# Patient Record
Sex: FEMALE | Race: WHITE | URBAN - METROPOLITAN AREA
[De-identification: names, ages, dates, MRNs, and addresses within clinical notes are randomized per-mention and may not be internally consistent; named-entity substitution may affect disease eponyms.]

---
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
## 2018-10-06 ENCOUNTER — ED HISTORICAL/CONVERTED ENCOUNTER (OUTPATIENT)
Dept: OTHER | Age: 18
End: 2018-10-06

## 2018-10-30 ENCOUNTER — ED HISTORICAL/CONVERTED ENCOUNTER (OUTPATIENT)
Dept: OTHER | Age: 18
End: 2018-10-30

## 2018-12-01 ENCOUNTER — ED HISTORICAL/CONVERTED ENCOUNTER (OUTPATIENT)
Dept: OTHER | Age: 18
End: 2018-12-01

## 2019-04-23 ENCOUNTER — ED HISTORICAL/CONVERTED ENCOUNTER (OUTPATIENT)
Dept: OTHER | Age: 19
End: 2019-04-23

## 2019-05-22 ENCOUNTER — ED HISTORICAL/CONVERTED ENCOUNTER (OUTPATIENT)
Dept: OTHER | Age: 19
End: 2019-05-22

## 2024-02-13 ENCOUNTER — TRANSCRIBE ORDERS (OUTPATIENT)
Facility: HOSPITAL | Age: 24
End: 2024-02-13

## 2024-02-13 DIAGNOSIS — M25.262 FLAIL JOINT OF LEFT KNEE: Primary | ICD-10-CM

## 2024-02-23 ENCOUNTER — HOSPITAL ENCOUNTER (OUTPATIENT)
Facility: HOSPITAL | Age: 24
End: 2024-02-23
Payer: MEDICAID

## 2024-02-23 DIAGNOSIS — M25.262 FLAIL JOINT OF LEFT KNEE: ICD-10-CM

## 2024-02-23 PROCEDURE — 73721 MRI JNT OF LWR EXTRE W/O DYE: CPT

## 2024-08-21 ENCOUNTER — HOSPITAL ENCOUNTER (EMERGENCY)
Facility: HOSPITAL | Age: 24
Discharge: HOME | End: 2024-08-21
Payer: MEDICAID

## 2024-08-21 VITALS
SYSTOLIC BLOOD PRESSURE: 120 MMHG | BODY MASS INDEX: 23.04 KG/M2 | WEIGHT: 130 LBS | OXYGEN SATURATION: 100 % | DIASTOLIC BLOOD PRESSURE: 87 MMHG | TEMPERATURE: 98.1 F | RESPIRATION RATE: 16 BRPM | HEIGHT: 63 IN | HEART RATE: 77 BPM

## 2024-08-21 DIAGNOSIS — J06.9 ACUTE UPPER RESPIRATORY INFECTION: ICD-10-CM

## 2024-08-21 DIAGNOSIS — J02.9 ACUTE PHARYNGITIS, UNSPECIFIED ETIOLOGY: Primary | ICD-10-CM

## 2024-08-21 LAB
DEPRECATED S PYO AG THROAT QL EIA: NEGATIVE
SARS-COV-2 RNA RESP QL NAA+PROBE: NOT DETECTED
SPECIMEN SOURCE: NORMAL

## 2024-08-21 PROCEDURE — 6360000002 HC RX W HCPCS: Performed by: NURSE PRACTITIONER

## 2024-08-21 PROCEDURE — 87147 CULTURE TYPE IMMUNOLOGIC: CPT

## 2024-08-21 PROCEDURE — 6370000000 HC RX 637 (ALT 250 FOR IP): Performed by: NURSE PRACTITIONER

## 2024-08-21 PROCEDURE — 87880 STREP A ASSAY W/OPTIC: CPT

## 2024-08-21 PROCEDURE — 99283 EMERGENCY DEPT VISIT LOW MDM: CPT

## 2024-08-21 PROCEDURE — 87070 CULTURE OTHR SPECIMN AEROBIC: CPT

## 2024-08-21 PROCEDURE — 87635 SARS-COV-2 COVID-19 AMP PRB: CPT

## 2024-08-21 RX ORDER — CEPHALEXIN 500 MG/1
500 CAPSULE ORAL 2 TIMES DAILY
Qty: 20 CAPSULE | Refills: 0 | Status: SHIPPED | OUTPATIENT
Start: 2024-08-21 | End: 2024-08-31

## 2024-08-21 RX ORDER — DEXAMETHASONE 4 MG/1
8 TABLET ORAL
Status: COMPLETED | OUTPATIENT
Start: 2024-08-21 | End: 2024-08-21

## 2024-08-21 RX ORDER — AZITHROMYCIN 500 MG/1
500 TABLET, FILM COATED ORAL
Status: DISCONTINUED | OUTPATIENT
Start: 2024-08-21 | End: 2024-08-21

## 2024-08-21 RX ORDER — IBUPROFEN 600 MG/1
600 TABLET ORAL 3 TIMES DAILY PRN
Qty: 30 TABLET | Refills: 0 | Status: SHIPPED | OUTPATIENT
Start: 2024-08-21

## 2024-08-21 RX ORDER — CEPHALEXIN 500 MG/1
500 CAPSULE ORAL
Status: COMPLETED | OUTPATIENT
Start: 2024-08-21 | End: 2024-08-21

## 2024-08-21 RX ADMIN — DEXAMETHASONE 8 MG: 4 TABLET ORAL at 16:50

## 2024-08-21 RX ADMIN — CEPHALEXIN 500 MG: 500 CAPSULE ORAL at 17:52

## 2024-08-21 ASSESSMENT — PAIN DESCRIPTION - DESCRIPTORS: DESCRIPTORS: ACHING

## 2024-08-21 ASSESSMENT — PAIN DESCRIPTION - ORIENTATION: ORIENTATION: MID

## 2024-08-21 ASSESSMENT — PAIN SCALES - GENERAL: PAINLEVEL_OUTOF10: 3

## 2024-08-21 ASSESSMENT — PAIN DESCRIPTION - FREQUENCY: FREQUENCY: INTERMITTENT

## 2024-08-21 ASSESSMENT — PAIN - FUNCTIONAL ASSESSMENT
PAIN_FUNCTIONAL_ASSESSMENT: ACTIVITIES ARE NOT PREVENTED
PAIN_FUNCTIONAL_ASSESSMENT: 0-10

## 2024-08-21 ASSESSMENT — PAIN DESCRIPTION - PAIN TYPE: TYPE: ACUTE PAIN

## 2024-08-21 ASSESSMENT — PAIN DESCRIPTION - LOCATION: LOCATION: CHEST

## 2024-08-21 NOTE — ED PROVIDER NOTES
120/87   Pulse: 77   Resp: 16   Temp: 98.1 °F (36.7 °C)   TempSrc: Oral   SpO2: 100%   Weight: 59 kg (130 lb)   Height: 1.6 m (5' 3\")        ED COURSE  ED Course as of 08/21/24 1752   Wed Aug 21, 2024   1714 Strep Screen Rapid:    Strep A Ag Negative [KR]   1733 Verified allergy to pcn is rash. Discussed with pharmacist who would suggest keflex over azithromycin. Patient comfortable with this and will be given dose prior to discharge and monitored for reaction. [KR]      ED Course User Index  [KR] Serena Winters, APRN - NP     Patient is well appearing, not in respiratory distress and has no airway obstructive findings. Due to normal exam, there is no concern for malignant process. Patient does not meet criteria for admission.    Understanding was insured that at this time there is no evidence for a more malignant underlying process, but that early in the process of an illness, an emergency department workup can be falsely reassuring.     Routine discharge counseling was given including the fact that any worsening, changing or persistent symptoms should prompt an immediate call or follow up with their primary physician or the emergency department. The importance of appropriate follow up was also discussed. More extensive discharge instructions were given in the patient's discharge paperwork.    After completion of evaluation and discussion of results and diagnoses, all the questions were answered. If required, all follow up appointments and treatments were discussed and explained. Understanding was insured prior to discharge.      SEPSIS Reassessment: Sepsis reassessment not applicable    Clinical Management Tools:  Not Applicable    Patient was given the following medications:  Medications   cephALEXin (KEFLEX) capsule 500 mg (has no administration in time range)   dexAMETHasone (DECADRON) tablet 8 mg (8 mg Oral Given 8/21/24 1650)       CONSULTS: See ED Course/MDM for further details.  None     Social

## 2024-08-24 ENCOUNTER — HOSPITAL ENCOUNTER (EMERGENCY)
Facility: HOSPITAL | Age: 24
Discharge: HOME | End: 2024-08-24
Payer: MEDICAID

## 2024-08-24 VITALS
OXYGEN SATURATION: 100 % | HEART RATE: 73 BPM | RESPIRATION RATE: 16 BRPM | SYSTOLIC BLOOD PRESSURE: 125 MMHG | DIASTOLIC BLOOD PRESSURE: 92 MMHG | TEMPERATURE: 98.2 F

## 2024-08-24 DIAGNOSIS — H10.32 ACUTE CONJUNCTIVITIS OF LEFT EYE, UNSPECIFIED ACUTE CONJUNCTIVITIS TYPE: Primary | ICD-10-CM

## 2024-08-24 LAB
BACTERIA SPEC CULT: ABNORMAL
BACTERIA SPEC CULT: ABNORMAL
Lab: ABNORMAL

## 2024-08-24 PROCEDURE — 99283 EMERGENCY DEPT VISIT LOW MDM: CPT

## 2024-08-24 RX ORDER — CIPROFLOXACIN HYDROCHLORIDE 3.5 MG/ML
SOLUTION/ DROPS TOPICAL
Qty: 5 ML | Refills: 0 | Status: SHIPPED | OUTPATIENT
Start: 2024-08-24

## 2024-08-24 ASSESSMENT — VISUAL ACUITY: OU: 1

## 2024-08-24 ASSESSMENT — LIFESTYLE VARIABLES
HOW MANY STANDARD DRINKS CONTAINING ALCOHOL DO YOU HAVE ON A TYPICAL DAY: PATIENT DOES NOT DRINK
HOW OFTEN DO YOU HAVE A DRINK CONTAINING ALCOHOL: NEVER

## 2024-08-24 NOTE — ED PROVIDER NOTES
Health Maintenance Due   Topic Date Due   • Shingles Vaccine (1 of 2) 12/31/2009   • Hepatitis C Screening  12/31/2010   • Influenza Vaccine (1) 09/01/2019   • DTaP/Tdap/Td Vaccine (2 - Td) 09/27/2019   • Depression Screening  05/06/2020       Patient is due for the topics as listed above and wishes to proceed with flu vaccine.         [facility] EMERGENCY DEPT  EMERGENCY DEPARTMENT HISTORY AND PHYSICAL EXAM      Date: 8/24/2024  Patient Name: Adrian Gardiner  MRN: [id number]  YOB: 2000  Date of evaluation: 8/24/2024  Provider: KRYSTIAN Salinas NP   Note Started: 10:19 AM EDT 8/24/24    HISTORY OF PRESENT ILLNESS     Chief Complaint   Patient presents with    Eye Problem       History Provided By: Patient    HPI: Adrian Gardiner is a 24 y.o. female Patient reports a 2 day history of left eye irritation, drainage and crusting.    - Vision changes (color change/floaters/distortion/diplopia/flashes): Denies  - Eye discharge: Yellow-green crusted  - Photophobia: Denies  - Pain on ocular movements: Denies  - Trauma or ocular foreign bodies: Denies  - URI symptoms: Yes recently treated for strep throat  - Fever: denies  - Sick contacts: denies  - Previous episodes: denies  - Contact lenses use: denies  - Other associated symptoms: denies, strep throat symptoms all but resolved.    PAST MEDICAL HISTORY   Past Medical History:  History reviewed. No pertinent past medical history.    Past Surgical History:  History reviewed. No pertinent surgical history.    Family History:  History reviewed. No pertinent family history.    Social History:  Social History     Tobacco Use    Smoking status: Never    Smokeless tobacco: Never   Vaping Use    Vaping status: Every Day   Substance Use Topics    Alcohol use: Never    Drug use: Yes     Types: Marijuana (Weed)       Allergies:  Allergies   Allergen Reactions    Codeine Anaphylaxis    Amoxil [Amoxicillin] Hives    Pcn [Penicillins] Hives    Sulfa Antibiotics      \" States sulfa causes internal bleeding in my family\". Patient has never taken medication.        PCP: Bridgett Broussard APRN - NP    Current Meds:   No current facility-administered medications for this encounter.     Current Outpatient Medications   Medication Sig Dispense Refill    ciprofloxacin (CILOXAN) 0.3 % ophthalmic solution  conjunctivitis, eye irritation. No evidence for corneal abrasion or ulcers. No reported trauma. No evidence of a more malignant etiology of symptoms including any concern for endophthalmitis, uveitis, retinal involvement, vascular eye processes given the reassuring history and absence of suggestive physical exam findings. No evidence of preseptal or post-septal cellulitis on physical exam.      Records Reviewed (source and summary of external notes): Prior medical records and Nursing notes    Vitals:    Vitals:    08/24/24 1000   BP: (!) 125/92   Pulse: 73   Resp: 16   Temp: 98.2 °F (36.8 °C)   SpO2: 100%        ED COURSE   Patient presents with a picture of uncomplicated conjunctivitis. Will discharge with eyedrops and follow up with return precautions. Specifically, the patient was told to return if worrying symptoms or worsening symptoms occur including but not limited to vision changes, facial/eye swelling, fever, or non-improvement of symptoms.    Understanding was insured that at this time there is no evidence for a more malignant underlying process, but that early in the process of an illness, an emergency department workup can be falsely reassuring.     Routine discharge counseling was given including the fact that any worsening, changing or persistent symptoms should prompt an immediate call or follow up with their primary physician or the emergency department. The importance of appropriate follow up was also discussed. More extensive discharge instructions were given in the patient's discharge paperwork.    After completion of evaluation and discussion of results and diagnoses, all the questions were answered. If required, all follow up appointments and treatments were discussed and explained. Understanding was insured prior to discharge.      SEPSIS Reassessment: Sepsis reassessment not applicable    Clinical Management Tools:  Not Applicable    Patient was given the following medications:  Medications -  No data to display    CONSULTS: See ED Course/MDM for further details.  None     Social Determinants affecting Diagnosis/Treatment: None    Smoking Cessation: Not Applicable    PROCEDURES   Unless otherwise noted above, none.  Procedures      CRITICAL CARE TIME   Patient does not meet Critical Care Time, 0 minutes    ED IMPRESSION     1. Acute conjunctivitis of left eye, unspecified acute conjunctivitis type          DISPOSITION/PLAN   DISPOSITION Decision To Discharge 08/24/2024 10:17:28 AM  Condition at Disposition: Good    Discharge Note: The patient is stable for discharge home. The signs, symptoms, diagnosis, and discharge instructions have been discussed, understanding conveyed, and agreed upon. The patient is to follow up as recommended or return to ER should their symptoms worsen.      PATIENT REFERRED TO:  Bridgett Broussard APRN - NP  [address] [place] [id number] [phone]          [facility]  [address] [facility] [address]  In 2 days  Optho referral if needed if you do not have a current opthamologist for follow up in the next 24-48 hours        DISCHARGE MEDICATIONS:     Medication List        START taking these medications      ciprofloxacin 0.3 % ophthalmic solution  Commonly known as: Ciloxan  Instill 1 to 2 drops in affected eye every 2-4 hours while awake for 2 days, THEN instill 1 to 2 drops 4 times a day for 5 more days            ASK your doctor about these medications      cephALEXin 500 MG capsule  Commonly known as: KEFLEX  Take 1 capsule by mouth 2 times daily for 10 days     ibuprofen 600 MG tablet  Commonly known as: ADVIL;MOTRIN  Take 1 tablet by mouth 3 times daily as needed for Pain               Where to Get Your Medications        These medications were sent to [facility]/pharmacy #[id number] - [place], [place] - [address] [facility] - P [phone] - F [phone] [address] [facility] [address]      Phone: [phone]   ciprofloxacin 0.3 %

## 2024-08-24 NOTE — ED TRIAGE NOTES
Pt reports waking up this am with her eye \"glued shut\" from drainage, reports drainage is green in color, eye is slightly red, reports irritation. Pt reports symptoms started yesterday. Was seen here on Wednesday for Uri, given abx. Pt reports slight blurry vision on left eye.